# Patient Record
Sex: FEMALE | Race: NATIVE HAWAIIAN OR OTHER PACIFIC ISLANDER | NOT HISPANIC OR LATINO
[De-identification: names, ages, dates, MRNs, and addresses within clinical notes are randomized per-mention and may not be internally consistent; named-entity substitution may affect disease eponyms.]

---

## 2022-11-17 ENCOUNTER — TRANSCRIBE ORDERS (OUTPATIENT)
Dept: OTHER | Age: 62
End: 2022-11-17

## 2022-11-17 DIAGNOSIS — C50.919 BREAST CANCER (H): Primary | ICD-10-CM

## 2022-12-02 NOTE — TELEPHONE ENCOUNTER
MEDICAL RECORDS REQUEST   Radiation Oncology  [address] [place] [address]  PHONE: [phone]  Fax: [phone]        FUTURE VISIT INFORMATION                                                   Denise Tsang, : 1960 scheduled for future visit at [facility] Radiation Oncology    RECORDS REQUESTED FOR VISIT                                                     NOTES STATUS DETAILS   OFFICE NOTE from referring provider [facility] 10/19/22: Dr. Nguyen Perez   OFFICE NOTE from medical oncologist [facility] 22: Dr. Lia Zurita   OPERATIVE REPORT [facility] 22: LUMPECTOMY UNILATERAL WITH SEED LOCALIZATION AND SENTINEL LYMPH NODE BIOPSY   MEDICATION LIST [facility]    LABS     PATHOLOGY REPORTS Report in [facility] 22: [id number]  10/10/22: [id number]   ANYTHING RELATED TO DIAGNOSIS [facility] Most recent 10/19/22   IMAGING (NEED IMAGES & REPORT)     MAMMO PACS 22-01/11/10   XRAYS PACS 20: DEXA   ULTRASOUND PACS 22-10/06/22: US Breast

## 2022-12-05 ENCOUNTER — PRE VISIT (OUTPATIENT)
Dept: RADIATION ONCOLOGY | Facility: CLINIC | Age: 62
End: 2022-12-05

## 2022-12-05 ENCOUNTER — APPOINTMENT (OUTPATIENT)
Dept: RADIATION ONCOLOGY | Facility: CLINIC | Age: 62
End: 2022-12-05
Payer: COMMERCIAL

## 2022-12-05 PROCEDURE — 77263 THER RADIOLOGY TX PLNG CPLX: CPT | Performed by: RADIOLOGY

## 2022-12-05 NOTE — PROGRESS NOTES
Dear Colleagues,  Today Denise Tsang was seen in consultation.  IDENTIFICATION: This is a 62 year old woman with left (LIQ) breast cancer, status post lumpectomy and SLNBx, revealing G2 IDCA with grade 3 DCIS, [id number] ER+/[place]+/H2N- disease referred for adjuvant radiation therapy. Her Oncotype score is 12 and she will not be receiving chemotherapy.  HISTORY OF PRESENT ILLNESS: Denise Tsang was in her usual state of health this past year.  On September 29, 2022 bilateral screening mammogram showed the left breast 7 o'clock position asymmetry.  The remainder of the breast tissue was unremarkable.  On October 6, 2022 left diagnostic mammogram and ultrasound confirmed asymmetry at the 7 o'clock position.  By ultrasound the mass measured 1.0 cm in greatest dimension and there was no axillary adenopathy.  October 10, 2022 left breast ultrasound-guided biopsy was consistent with grade 3 IDC, ER/[place] positive HER2/isdiro negative  On November 8, 2022, Dr. Perez took her to the OR and she had a left breast lumpectomy and sentinel lymph node biopsy.  Pathology revealed single focus of 1.1 cm of grade 2 invasive ductal carcinoma with grade 3 DCIS.  Negative LVSI.  Negative invasive margins and adequate noninvasive margins.  There were 0 out of 2 lymph nodes.  This gave her a pathologic stage of [id number] ER/[place] positive HER2/isidro negative disease. Specimen radiograph was completed.  The left breast surgical specimen radiograph demonstrated the tissue containing the BX marker clip and biopsy site marker corresponding to the known area of malignancy.  Her postoperative course has been unenventful.  She was recently seen by Dr. Zurita.  Her Oncotype score is 12. The benefit of treatment did not outweigh the risks and no systemic chemotherapy is recommended.  She is to receive adjuvant endocrine therapy after XRT.    Since her surgery, she has continued to heal well and denies any significant pain.  She has good ROM.   "She denies any other new masses, skin changes, shortness of breath, chest or bony pain, or new neurologic symptoms. She is being seen here today for consideration of postoperative radiotherapy.  REVIEW OF SYSTEMS: As per HPI, a 14-point review of system is otherwise negative.  PAST RADIATION THERAPY:  Denies.  PAST CTD/PACEMAKER: Denies  BREAST RISK FACTORS:  No history of prior breast biopsy. No family history of breast or ovarian cancer. She started her menses at age 9.   (twins) with her first delivery at age 26. She did not breastfeed. No history of HRT. OCP use x 20 years.  Hysterectomy at age 41.    Past Medical History:   Diagnosis Date     Fear of flying 2016     Hyperlipidemia 2003    Formatting of this note might be different from the original. We used the 10 yr ASCVD risk  using her current risk factors and  her \"worst\" cholesterol numbers in , and found that her ASCVD risk was  low, around 3%, and she may not be in a statin benefit group after all.   Denise prefers not to use medication if not necessary. Will hold  atorvastatin for three months, recheck lipids      Malignant neoplasm of lower-inner quadrant of left breast in female, estrogen receptor positive (H) 10/11/2022     YIMI (obstructive sleep apnea) 2022    Last Assessment & Plan:  Formatting of this note might be different from the original. HST done 22 (wt 175 lbs) RDI 33 Lowest 02 80% Positional therapy with dental appliance     Sciatica 2013     Varicose veins of both lower extremities with complications 3/21/2019    Formatting of this note might be different from the original. Added automatically from request for surgery [id number]       Past Surgical History:   Procedure Laterality Date     APPENDECTOMY       BREAST BIOPSY, RT/LT Left 10/10/2022     IR ENDOVENOUS ABLATION VARICOSE VEINS-RFA       LUMPECTOMY BREAST WITH SENTINEL NODE, COMBINED Left 2022    L breast seed localized lumpectomy with L " SLN bx Dr. Nguyen Perez [employer]     PARTIAL HYSTERECTOMY      ovaries intact, age 41       History reviewed. No pertinent family history.    Social History     Tobacco Use     Smoking status: Former     Packs/day: 1.00     Years: 10.00     Pack years: 10.00     Types: Cigarettes     Quit date: 1986     Years since quittin.9     Smokeless tobacco: Never   Substance Use Topics     Alcohol use: Not Currently     Comment: 3 per week     Current Outpatient Medications   Medication     atorvastatin (LIPITOR) 20 MG tablet     minoxidil (LONITEN) 2.5 MG tablet     Multiple Vitamin (MULTIVITAMIN ADULT PO)     spironolactone (ALDACTONE) 50 MG tablet     albuterol (PROAIR HFA/PROVENTIL HFA/VENTOLIN HFA) 108 (90 Base) MCG/ACT inhaler     ALPRAZolam (XANAX) 0.5 MG tablet     fluticasone (FLONASE) 50 MCG/ACT nasal spray     gabapentin (NEURONTIN) 100 MG capsule     No current facility-administered medications for this visit.        Allergies   Allergen Reactions     Azithromycin Nausea and Vomiting     Erythromycin Nausea and Vomiting     PHYSICAL EXAMINATION:  /72   Pulse 77   Temp 98.4  F (36.9  C) (Oral)   Resp 16   Wt 82.9 kg (182 lb 11.2 oz)   SpO2 96%   GENERAL Well-appearing woman in no acute distress.  HEENT Normocephalic, atraumatic.  Sclerae anicteric.  CVR  Regular rate and rhythm.  No murmurs, rubs, or gallops.  LUNGS Clear to auscultation bilaterally.  BREASTS Breasts are examined in the supine and upright position.  The breasts are symmetric.  The right breast is unremarkable, as there is no erythema, ulceration or suspicious nodularity within it.  Within the left lower inner breast and left axilla there are two well healed incisions.  Firmness of these incisions is not suspicious.  There is no suspicious erythema, ulceration or nodularity within the left breast.  There is no erythema, retraction, desquamation or discharge appreciated within the right or left nipple areolar complex.     LYMPH No supraclavicular, infraclavicular, or axillary lymphadenopathy appreciated bilaterally.  ABDOMEN Soft.    EXT  No clubbing or cyanosis or edema.    NEURO No focal deficits.  MSK  Good ROM.   SKIN  Warm and well perfused.    PSYCH  Alert and oriented x 3    ECOG PERFORMANCE STATUS: 0    IMPRESSION/PLAN: Denise Tsang is a 62 year old woman with left (LIQ) breast cancer, status post lumpectomy and SLNBx, revealing G2 IDCA with grade 3 DCIS, [id number] ER+/[place]+/H2N- disease referred for adjuvant radiation therapy. Her Oncotype score is 12 and she will not be receiving chemotherapy.  I recommend adjuvant XRT to improve local control and overall survival.  Plan is to treat the affected breast based on multiple randomized prospective data which show decrease risk of local recurrence by ~50% with the addition of XRT to BCS and the EBCTCG metaanalysis published in Lancet 2011 which shows that for every 4 recurrences avoided by year 10 one breast cancer death is avoided by year 15.    The risks, benefits, treatment rationale and regimen of radiation therapy to the breast were discussed in great detail today with the patient.  Risks include but are not limited to skin erythema, desquamation, hyperpigmentation, breast and lymphedema, fibrosis, telengectasia, pneumonitis, cardiac toxicity, rib fracture and secondary malignancy. The patient consented to therapy and will have a CT simulation completed today. Treatment will start within 1 week.    Additional problem list to be addressed in the following manner:  1. Systemic/hormonal treatment : No systemic chemo recommended.  Will f/u with Med Onc 1-2 weeks after XRT completed to discuss adjuvant endocrine therapy.     There was ample time for questions and all were answered to the patient's satisfaction. Thank you for allowing me to participate in the care of this pleasant patient. If you have any questions, please do not hesitate to contact my  office.    Sincerely,  Fadi Steele MD

## 2022-12-06 ENCOUNTER — OFFICE VISIT (OUTPATIENT)
Dept: RADIATION ONCOLOGY | Facility: CLINIC | Age: 62
End: 2022-12-06
Payer: COMMERCIAL

## 2022-12-06 VITALS
RESPIRATION RATE: 16 BRPM | OXYGEN SATURATION: 96 % | DIASTOLIC BLOOD PRESSURE: 72 MMHG | HEART RATE: 77 BPM | SYSTOLIC BLOOD PRESSURE: 111 MMHG | TEMPERATURE: 98.4 F | WEIGHT: 182.7 LBS

## 2022-12-06 DIAGNOSIS — Z17.0 MALIGNANT NEOPLASM OF LOWER-INNER QUADRANT OF LEFT BREAST IN FEMALE, ESTROGEN RECEPTOR POSITIVE (H): Primary | ICD-10-CM

## 2022-12-06 DIAGNOSIS — C50.312 MALIGNANT NEOPLASM OF LOWER-INNER QUADRANT OF LEFT BREAST IN FEMALE, ESTROGEN RECEPTOR POSITIVE (H): Primary | ICD-10-CM

## 2022-12-06 PROBLEM — I83.893 VARICOSE VEINS OF BOTH LOWER EXTREMITIES WITH COMPLICATIONS: Status: ACTIVE | Noted: 2019-03-21

## 2022-12-06 PROBLEM — G47.33 OSA (OBSTRUCTIVE SLEEP APNEA): Status: ACTIVE | Noted: 2022-07-05

## 2022-12-06 PROCEDURE — 99205 OFFICE O/P NEW HI 60 MIN: CPT | Performed by: RADIOLOGY

## 2022-12-06 RX ORDER — GABAPENTIN 100 MG/1
1-3 CAPSULE ORAL
COMMUNITY
Start: 2022-08-07

## 2022-12-06 RX ORDER — ATORVASTATIN CALCIUM 20 MG/1
20 TABLET, FILM COATED ORAL DAILY
COMMUNITY
Start: 2022-12-04

## 2022-12-06 RX ORDER — ALPRAZOLAM 0.5 MG
0.5 TABLET ORAL DAILY PRN
COMMUNITY
Start: 2022-01-11

## 2022-12-06 RX ORDER — SPIRONOLACTONE 50 MG/1
50 TABLET, FILM COATED ORAL 2 TIMES DAILY
COMMUNITY
Start: 2022-10-18

## 2022-12-06 RX ORDER — ALBUTEROL SULFATE 90 UG/1
1-2 AEROSOL, METERED RESPIRATORY (INHALATION) EVERY 4 HOURS PRN
COMMUNITY
Start: 2022-11-29

## 2022-12-06 RX ORDER — MINOXIDIL 2.5 MG/1
2.5 TABLET ORAL DAILY
COMMUNITY
Start: 2022-09-15

## 2022-12-06 RX ORDER — FLUTICASONE PROPIONATE 50 MCG
2 SPRAY, SUSPENSION (ML) NASAL DAILY PRN
COMMUNITY
Start: 2022-10-19

## 2022-12-06 ASSESSMENT — PAIN SCALES - GENERAL: PAINLEVEL: NO PAIN (0)

## 2022-12-06 NOTE — LETTER
12/6/2022         RE: Denise Tsang  [address] [facility] [place] [address]        Dear Colleague,    Thank you for referring your patient, Denise Tsang, to the [facility] RADIATION ONCOLOGY [place]. Please see a copy of my visit note below.    Dear Colleagues,  Today Denise Tsang was seen in consultation.  IDENTIFICATION: This is a 62 year old woman with left (LIQ) breast cancer, status post lumpectomy and SLNBx, revealing G2 IDCA with grade 3 DCIS, [id number] ER+/[place]+/H2N- disease referred for adjuvant radiation therapy. Her Oncotype score is 12 and she will not be receiving chemotherapy.  HISTORY OF PRESENT ILLNESS: Denise Tsang was in her usual state of health this past year.  On September 29, 2022 bilateral screening mammogram showed the left breast 7 o'clock position asymmetry.  The remainder of the breast tissue was unremarkable.  On October 6, 2022 left diagnostic mammogram and ultrasound confirmed asymmetry at the 7 o'clock position.  By ultrasound the mass measured 1.0 cm in greatest dimension and there was no axillary adenopathy.  October 10, 2022 left breast ultrasound-guided biopsy was consistent with grade 3 IDC, ER/UT positive HER2/isidro negative  On November 8, 2022, Dr. Perez took her to the OR and she had a left breast lumpectomy and sentinel lymph node biopsy.  Pathology revealed single focus of 1.1 cm of grade 2 invasive ductal carcinoma with grade 3 DCIS.  Negative LVSI.  Negative invasive margins and adequate noninvasive margins.  There were 0 out of 2 lymph nodes.  This gave her a pathologic stage of [id number] ER/UT positive HER2/isidro negative disease. Specimen radiograph was completed.  The left breast surgical specimen radiograph demonstrated the tissue containing the BX marker clip and biopsy site marker corresponding to the known area of malignancy.  Her postoperative course has been unenventful.  She was recently seen by Dr. Zurita.  Her Oncotype  "score is 12. The benefit of treatment did not outweigh the risks and no systemic chemotherapy is recommended.  She is to receive adjuvant endocrine therapy after XRT.    Since her surgery, she has continued to heal well and denies any significant pain.  She has good ROM.  She denies any other new masses, skin changes, shortness of breath, chest or bony pain, or new neurologic symptoms. She is being seen here today for consideration of postoperative radiotherapy.  REVIEW OF SYSTEMS: As per HPI, a 14-point review of system is otherwise negative.  PAST RADIATION THERAPY:  Denies.  PAST CTD/PACEMAKER: Denies  BREAST RISK FACTORS:  No history of prior breast biopsy. No family history of breast or ovarian cancer. She started her menses at age 9.   (twins) with her first delivery at age 26. She did not breastfeed. No history of HRT. OCP use x 20 years.  Hysterectomy at age 41.    Past Medical History:   Diagnosis Date     Fear of flying 2016     Hyperlipidemia 2003    Formatting of this note might be different from the original. We used the 10 yr ASCVD risk  using her current risk factors and  her \"worst\" cholesterol numbers in , and found that her ASCVD risk was  low, around 3%, and she may not be in a statin benefit group after all.   Denise prefers not to use medication if not necessary. Will hold  atorvastatin for three months, recheck lipids      Malignant neoplasm of lower-inner quadrant of left breast in female, estrogen receptor positive (H) 10/11/2022     YIMI (obstructive sleep apnea) 2022    Last Assessment & Plan:  Formatting of this note might be different from the original. HST done 22 (wt 175 lbs) RDI 33 Lowest 02 80% Positional therapy with dental appliance     Sciatica 2013     Varicose veins of both lower extremities with complications 3/21/2019    Formatting of this note might be different from the original. Added automatically from request for surgery [id number] "       Past Surgical History:   Procedure Laterality Date     APPENDECTOMY       BREAST BIOPSY, RT/LT Left 10/10/2022     IR ENDOVENOUS ABLATION VARICOSE VEINS-RFA       LUMPECTOMY BREAST WITH SENTINEL NODE, COMBINED Left 2022    L breast seed localized lumpectomy with L SLN bx Dr. Nguyen Perez [employer]     PARTIAL HYSTERECTOMY      ovaries intact, age 41       History reviewed. No pertinent family history.    Social History     Tobacco Use     Smoking status: Former     Packs/day: 1.00     Years: 10.00     Pack years: 10.00     Types: Cigarettes     Quit date: 1986     Years since quittin.9     Smokeless tobacco: Never   Substance Use Topics     Alcohol use: Not Currently     Comment: 3 per week     Current Outpatient Medications   Medication     atorvastatin (LIPITOR) 20 MG tablet     minoxidil (LONITEN) 2.5 MG tablet     Multiple Vitamin (MULTIVITAMIN ADULT PO)     spironolactone (ALDACTONE) 50 MG tablet     albuterol (PROAIR HFA/PROVENTIL HFA/VENTOLIN HFA) 108 (90 Base) MCG/ACT inhaler     ALPRAZolam (XANAX) 0.5 MG tablet     fluticasone (FLONASE) 50 MCG/ACT nasal spray     gabapentin (NEURONTIN) 100 MG capsule     No current facility-administered medications for this visit.        Allergies   Allergen Reactions     Azithromycin Nausea and Vomiting     Erythromycin Nausea and Vomiting     PHYSICAL EXAMINATION:  /72   Pulse 77   Temp 98.4  F (36.9  C) (Oral)   Resp 16   Wt 82.9 kg (182 lb 11.2 oz)   SpO2 96%   GENERAL Well-appearing woman in no acute distress.  HEENT Normocephalic, atraumatic.  Sclerae anicteric.  CVR  Regular rate and rhythm.  No murmurs, rubs, or gallops.  LUNGS Clear to auscultation bilaterally.  BREASTS Breasts are examined in the supine and upright position.  The breasts are symmetric.  The right breast is unremarkable, as there is no erythema, ulceration or suspicious nodularity within it.  Within the left lower inner breast and left axilla there are two well  healed incisions.  Firmness of these incisions is not suspicious.  There is no suspicious erythema, ulceration or nodularity within the left breast.  There is no erythema, retraction, desquamation or discharge appreciated within the right or left nipple areolar complex.    LYMPH No supraclavicular, infraclavicular, or axillary lymphadenopathy appreciated bilaterally.  ABDOMEN Soft.    EXT  No clubbing or cyanosis or edema.    NEURO No focal deficits.  MSK  Good ROM.   SKIN  Warm and well perfused.    PSYCH  Alert and oriented x 3    ECOG PERFORMANCE STATUS: 0    IMPRESSION/PLAN: Denise Tsang is a 62 year old woman with left (LIQ) breast cancer, status post lumpectomy and SLNBx, revealing G2 IDCA with grade 3 DCIS, [id number] ER+/RI+/H2N- disease referred for adjuvant radiation therapy. Her Oncotype score is 12 and she will not be receiving chemotherapy.  I recommend adjuvant XRT to improve local control and overall survival.  Plan is to treat the affected breast based on multiple randomized prospective data which show decrease risk of local recurrence by ~50% with the addition of XRT to BCS and the EBCTCG metaanalysis published in Lancet 2011 which shows that for every 4 recurrences avoided by year 10 one breast cancer death is avoided by year 15.    The risks, benefits, treatment rationale and regimen of radiation therapy to the breast were discussed in great detail today with the patient.  Risks include but are not limited to skin erythema, desquamation, hyperpigmentation, breast and lymphedema, fibrosis, telengectasia, pneumonitis, cardiac toxicity, rib fracture and secondary malignancy. The patient consented to therapy and will have a CT simulation completed today. Treatment will start within 1 week.    Additional problem list to be addressed in the following manner:  1. Systemic/hormonal treatment : No systemic chemo recommended.  Will f/u with Med Onc 1-2 weeks after XRT completed to discuss adjuvant  endocrine therapy.     There was ample time for questions and all were answered to the patient's satisfaction. Thank you for allowing me to participate in the care of this pleasant patient. If you have any questions, please do not hesitate to contact my office.    Sincerely,  Fadi Steele MD            Again, thank you for allowing me to participate in the care of your patient.        Sincerely,        DEBBY Steele MD

## 2022-12-06 NOTE — NURSING NOTE
REASON FOR APPOINTMENT   Type of Cancer: L breast IDC ER/MD+ HER2-   Location: L breast  Date of Symptom Onset: Bilateral screening mammogram 22    TREATMENT TO-DATE FOR THIS CANCER  Surgery ? 22  L breast seed localized lumpectomy with L SLN bx Dr. Nguyen Perez   Chemotherapy ? No, Oncotype still pending  Other Treatments for this Cancer ? no    PERSONAL HISTORY OF CANCER   Previous Cancer ? no   Prior Radiation ? no   Prior Chemotherapy ? no   Prior Hormonal Therapy ? no     REFERRALS NEEDED  no    VITALS  /72   Pulse 77   Temp 98.4  F (36.9  C) (Oral)   Resp 16   Wt 82.9 kg (182 lb 11.2 oz)   SpO2 96%     PACEMAKER/IMPLANTED CARDIAC DEVICE no    PAIN  Denies    PSYCHOSOCIAL  Marital Status:   Patient lives in home with spouse.  Number of children: 2  Working status: Retired  Do you feel safe in your home? Yes    REVIEW OF SYSTEMS  Skin: negative  Eyes: positive for glasses  Ears/Nose/Throat: negative  Respiratory: No shortness of breath, dyspnea on exertion, cough, or hemoptysis  Cardiovascular: negative  Gastrointestinal: positive for constipation  Genitourinary: negative  Musculoskeletal: negative  Neurologic: negative  Psychiatric: negative  Hematologic/Lymphatic/Immunologic: negative  Endocrine: negative    WOMEN ONLY  Any chance you may be pregnant: No  Age at first period: 9  Date of last period: partial hysterectomy age 41  Number of pregnancies: 4  Live Births: 1, twins  Age at 1st delivery: 26  Breastfeeding: No  Birth Control pills: Yes  If yes, for how lon years  HRT: No    Radiation Oncology Patient Teaching    Current Concern: Breast cancer    Person involved with teaching: Patient and   Patient asked Questions: Yes  Patient was cooperative: Yes  Patient was receptive (willing to accept information given): Yes    Education Assessment  Comprehension ability: High  Knowledge level: Medium  Factors affecting teaching: None    Education Materials Given  Caring for  Your Skin During Radiation, Aquaphor or Vanicream, Fatigue    Educational Topics Discussed  Side effects- see above    Response To Teaching  Verbalizes understanding    Do you have an advanced directive or living will? No  Are you DNR/DNI? No    Camille Danielle RN

## 2022-12-08 ENCOUNTER — PATIENT OUTREACH (OUTPATIENT)
Dept: RADIATION ONCOLOGY | Facility: CLINIC | Age: 62
End: 2022-12-08

## 2022-12-08 NOTE — TELEPHONE ENCOUNTER
Received telephone call from patient reporting she has recently seen Dr. Steele for consultation for radiation treatment.  Patient reports her Oncotype result through [employer] has been finalized and she has seen in [facility] that her Oncotype score is 12.  Patient reports she is scheduled to visit with Dr. Zurita on 12/14/2022 for review of her results.  Patient is wondering if she can move forward with next steps of CT Simulation for her radiation treatment at this time or if she needs to wait until after her visit with Dr. Zurita.  Patient requests if she needs to wait until after her visit with Dr. Zurita, she wonders if she can schedule an appointment at this time.  Informed patient that this RN would relay above information to Dr. Steele and Camille Danielle RN with request to contact patient to assist in next steps.  Patient verbalized understanding of all information and had no additional questions at this time.    In-basket message sent to Dr. Steele and Camille Danielle RN with above information and request to contact patient.    Colleen Bowers, RN BSN OCN CBCN

## 2022-12-13 ENCOUNTER — APPOINTMENT (OUTPATIENT)
Dept: RADIATION ONCOLOGY | Facility: CLINIC | Age: 62
End: 2022-12-13
Payer: COMMERCIAL

## 2022-12-13 PROCEDURE — 77290 THER RAD SIMULAJ FIELD CPLX: CPT | Performed by: RADIOLOGY

## 2022-12-13 PROCEDURE — 77332 RADIATION TREATMENT AID(S): CPT | Performed by: RADIOLOGY

## 2022-12-22 ENCOUNTER — APPOINTMENT (OUTPATIENT)
Dept: RADIATION ONCOLOGY | Facility: CLINIC | Age: 62
End: 2022-12-22
Payer: COMMERCIAL

## 2022-12-22 PROCEDURE — 77300 RADIATION THERAPY DOSE PLAN: CPT | Performed by: RADIOLOGY

## 2022-12-22 PROCEDURE — 77295 3-D RADIOTHERAPY PLAN: CPT | Performed by: RADIOLOGY

## 2022-12-22 PROCEDURE — 77293 RESPIRATOR MOTION MGMT SIMUL: CPT | Performed by: RADIOLOGY

## 2022-12-22 PROCEDURE — 77334 RADIATION TREATMENT AID(S): CPT | Performed by: RADIOLOGY

## 2022-12-26 ENCOUNTER — APPOINTMENT (OUTPATIENT)
Dept: RADIATION ONCOLOGY | Facility: CLINIC | Age: 62
End: 2022-12-26
Payer: COMMERCIAL

## 2022-12-26 PROCEDURE — 77280 THER RAD SIMULAJ FIELD SMPL: CPT | Performed by: RADIOLOGY

## 2022-12-27 ENCOUNTER — APPOINTMENT (OUTPATIENT)
Dept: RADIATION ONCOLOGY | Facility: CLINIC | Age: 62
End: 2022-12-27
Payer: COMMERCIAL

## 2022-12-27 PROCEDURE — G6012 RADIATION TREATMENT DELIVERY: HCPCS | Performed by: RADIOLOGY

## 2022-12-27 PROCEDURE — G6017 INTRAFRACTION TRACK MOTION: HCPCS | Performed by: RADIOLOGY

## 2022-12-28 ENCOUNTER — OFFICE VISIT (OUTPATIENT)
Dept: RADIATION ONCOLOGY | Facility: CLINIC | Age: 62
End: 2022-12-28
Payer: COMMERCIAL

## 2022-12-28 ENCOUNTER — APPOINTMENT (OUTPATIENT)
Dept: RADIATION ONCOLOGY | Facility: CLINIC | Age: 62
End: 2022-12-28
Payer: COMMERCIAL

## 2022-12-28 VITALS
RESPIRATION RATE: 16 BRPM | SYSTOLIC BLOOD PRESSURE: 133 MMHG | TEMPERATURE: 97.7 F | HEART RATE: 73 BPM | WEIGHT: 184 LBS | OXYGEN SATURATION: 99 % | DIASTOLIC BLOOD PRESSURE: 84 MMHG

## 2022-12-28 DIAGNOSIS — Z17.0 MALIGNANT NEOPLASM OF LOWER-INNER QUADRANT OF LEFT BREAST IN FEMALE, ESTROGEN RECEPTOR POSITIVE (H): Primary | ICD-10-CM

## 2022-12-28 DIAGNOSIS — C50.312 MALIGNANT NEOPLASM OF LOWER-INNER QUADRANT OF LEFT BREAST IN FEMALE, ESTROGEN RECEPTOR POSITIVE (H): Primary | ICD-10-CM

## 2022-12-28 PROCEDURE — G6017 INTRAFRACTION TRACK MOTION: HCPCS | Performed by: RADIOLOGY

## 2022-12-28 PROCEDURE — G6012 RADIATION TREATMENT DELIVERY: HCPCS | Performed by: RADIOLOGY

## 2022-12-28 PROCEDURE — 99207 PR DROP WITH A PROCEDURE: CPT | Performed by: RADIOLOGY

## 2022-12-28 ASSESSMENT — PAIN SCALES - GENERAL: PAINLEVEL: NO PAIN (0)

## 2022-12-28 NOTE — PROGRESS NOTES
[facility]  SPECIALIZING IN BREAKTHROUGHS  Radiation Oncology    On Treatment Visit Note      Denise Tsang      Date: 2022   MRN: [id number]   : 1960  Diagnosis: L breast IDC ER/[place]+ HEr2-      Reason for Visit:  On Radiation Treatment Visit     Treatment Summary to Date  Treatment Site: L breast + bst Current Dose: 532/5256 cGy Fractions:       Chemotherapy  Chemo concurrent with radx?: No (Dr. Zurita)    Subjective:     Early in treatment doing well with no unexpected side effects.    Nursing ROS:   Nutrition Alteration  Diet Type: Patient's Preference  Skin  Skin Reaction: 0 - No changes  Skin Intervention: Aquaphor BID        Cardiovascular  Respiratory effort: 1 - Normal - without distress        Psychosocial  Psychosocial Note: Patient denies fatigue  Pain Assessment  0-10 Pain Scale: 0      Objective:   /84   Pulse 73   Temp 97.7  F (36.5  C) (Oral)   Resp 16   Wt 83.5 kg (184 lb)   SpO2 99%   Gen: Appears well, in no acute distress  Skin: No erythema    Labs:  CBC RESULTS: No results for input(s): WBC, RBC, HGB, HCT, MCV, MCH, MCHC, RDW, PLT in the last 76495 hours.  ELECTROLYTES:  No results for input(s): NA, POTASSIUM, CHLORIDE, DAVID, CO2, BUN, CR, GLC in the last 49526 hours.    Assessment:    Tolerating radiation therapy well.  All questions and concerns addressed.    Plan:   1. Continue current therapy.    2. Skin care as previously directed.      Mosaiq chart and setup information reviewed  MVCT/IGRT images checked    Medication Review  Med list reviewed with patient?: Yes    Educational Topic Discussed  Education Instructions: Skin cares, fatigue        Fadi Steele MD    Please do not send letter to referring physician.

## 2022-12-28 NOTE — LETTER
2022         RE: Denise Tsang  [address] [facility] [address]        Dear Colleague,    Thank you for referring your patient, Denise Tsang, to the [facility] [place]. Please see a copy of my visit note below.    [facility]  SPECIALIZING IN BREAKTHROUGHS  Radiation Oncology    On Treatment Visit Note      Denise Tsang      Date: 2022   MRN: [id number]   : 1960  Diagnosis: L breast IDC ER/MI+ HEr2-      Reason for Visit:  On Radiation Treatment Visit     Treatment Summary to Date  Treatment Site: L breast + bst Current Dose: 532/5256 cGy Fractions:       Chemotherapy  Chemo concurrent with radx?: No (Dr. Zurita)    Subjective:     Early in treatment doing well with no unexpected side effects.    Nursing ROS:   Nutrition Alteration  Diet Type: Patient's Preference  Skin  Skin Reaction: 0 - No changes  Skin Intervention: Aquaphor BID        Cardiovascular  Respiratory effort: 1 - Normal - without distress        Psychosocial  Psychosocial Note: Patient denies fatigue  Pain Assessment  0-10 Pain Scale: 0      Objective:   /84   Pulse 73   Temp 97.7  F (36.5  C) (Oral)   Resp 16   Wt 83.5 kg (184 lb)   SpO2 99%   Gen: Appears well, in no acute distress  Skin: No erythema    Labs:  CBC RESULTS: No results for input(s): WBC, RBC, HGB, HCT, MCV, MCH, MCHC, RDW, PLT in the last 75403 hours.  ELECTROLYTES:  No results for input(s): NA, POTASSIUM, CHLORIDE, DAVID, CO2, BUN, CR, GLC in the last 76901 hours.    Assessment:    Tolerating radiation therapy well.  All questions and concerns addressed.    Plan:   1. Continue current therapy.    2. Skin care as previously directed.      Mosaiq chart and setup information reviewed  MVCT/IGRT images checked    Medication Review  Med list reviewed with patient?: Yes    Educational Topic Discussed  Education Instructions: Skin cares, fatigue        Fadi Steele  MD    Please do not send letter to referring physician.          Again, thank you for allowing me to participate in the care of your patient.        Sincerely,        DEBBY Steele MD

## 2022-12-29 ENCOUNTER — APPOINTMENT (OUTPATIENT)
Dept: RADIATION ONCOLOGY | Facility: CLINIC | Age: 62
End: 2022-12-29
Payer: COMMERCIAL

## 2022-12-29 PROCEDURE — 77412 RADIATION TX DELIVERY LVL 3: CPT | Performed by: RADIOLOGY

## 2022-12-29 PROCEDURE — 77387 GUIDANCE FOR RADJ TX DLVR: CPT | Performed by: RADIOLOGY

## 2022-12-30 ENCOUNTER — APPOINTMENT (OUTPATIENT)
Dept: RADIATION ONCOLOGY | Facility: CLINIC | Age: 62
End: 2022-12-30
Payer: COMMERCIAL

## 2022-12-30 PROCEDURE — G6012 RADIATION TREATMENT DELIVERY: HCPCS | Performed by: RADIOLOGY

## 2022-12-30 PROCEDURE — G6017 INTRAFRACTION TRACK MOTION: HCPCS | Performed by: RADIOLOGY

## 2023-01-02 ENCOUNTER — APPOINTMENT (OUTPATIENT)
Dept: RADIATION ONCOLOGY | Facility: CLINIC | Age: 63
End: 2023-01-02
Payer: COMMERCIAL

## 2023-01-02 PROCEDURE — 77387 GUIDANCE FOR RADJ TX DLVR: CPT | Performed by: RADIOLOGY

## 2023-01-02 PROCEDURE — 77412 RADIATION TX DELIVERY LVL 3: CPT | Performed by: RADIOLOGY

## 2023-01-02 PROCEDURE — 77427 RADIATION TX MANAGEMENT X5: CPT | Performed by: RADIOLOGY

## 2023-01-02 PROCEDURE — 77336 RADIATION PHYSICS CONSULT: CPT | Performed by: RADIOLOGY

## 2023-01-03 ENCOUNTER — APPOINTMENT (OUTPATIENT)
Dept: RADIATION ONCOLOGY | Facility: CLINIC | Age: 63
End: 2023-01-03
Payer: COMMERCIAL

## 2023-01-03 PROCEDURE — G6012 RADIATION TREATMENT DELIVERY: HCPCS | Performed by: RADIOLOGY

## 2023-01-04 ENCOUNTER — OFFICE VISIT (OUTPATIENT)
Dept: RADIATION ONCOLOGY | Facility: CLINIC | Age: 63
End: 2023-01-04
Payer: COMMERCIAL

## 2023-01-04 ENCOUNTER — APPOINTMENT (OUTPATIENT)
Dept: RADIATION ONCOLOGY | Facility: CLINIC | Age: 63
End: 2023-01-04
Payer: COMMERCIAL

## 2023-01-04 VITALS
HEART RATE: 70 BPM | DIASTOLIC BLOOD PRESSURE: 77 MMHG | OXYGEN SATURATION: 97 % | WEIGHT: 185.9 LBS | SYSTOLIC BLOOD PRESSURE: 139 MMHG | RESPIRATION RATE: 18 BRPM | TEMPERATURE: 98 F

## 2023-01-04 DIAGNOSIS — C50.312 MALIGNANT NEOPLASM OF LOWER-INNER QUADRANT OF LEFT BREAST IN FEMALE, ESTROGEN RECEPTOR POSITIVE (H): Primary | ICD-10-CM

## 2023-01-04 DIAGNOSIS — Z17.0 MALIGNANT NEOPLASM OF LOWER-INNER QUADRANT OF LEFT BREAST IN FEMALE, ESTROGEN RECEPTOR POSITIVE (H): Primary | ICD-10-CM

## 2023-01-04 PROCEDURE — 99207 PR DROP WITH A PROCEDURE: CPT | Performed by: RADIOLOGY

## 2023-01-04 PROCEDURE — G6012 RADIATION TREATMENT DELIVERY: HCPCS | Performed by: RADIOLOGY

## 2023-01-04 PROCEDURE — G6017 INTRAFRACTION TRACK MOTION: HCPCS | Performed by: RADIOLOGY

## 2023-01-04 ASSESSMENT — PAIN SCALES - GENERAL: PAINLEVEL: NO PAIN (0)

## 2023-01-04 NOTE — LETTER
2023         RE: Denise Tsang  [address] [facility] [address]        Dear Colleague,    Thank you for referring your patient, Denise Tsang, to the [facility] RADIATION [facility]COL[facility] [place]. Please see a copy of my visit note below.    .mgrn      [facility]YS[facility]ANS  SPECIALIZING IN BREAKTHROUGHS  Radiation Oncology    On Treatment Visit Note      Denise Tsang      Date: 2023   MRN: [id number]   : 1960  Diagnosis: L breast IDC ER/[place]+ HEr2-      Reason for Visit:  On Radiation Treatment Visit     Treatment Summary to Date  Treatment Site: L breast + bst Current Dose: 1862/5256 cGy Fractions:       Chemotherapy  Chemo concurrent with radx?: No (Dr. Zurita)    Subjective:     Early in treatment doing well with no complaints.    Nursing ROS:   Nutrition Alteration  Diet Type: Patient's Preference  Skin  Skin Intervention: patient reports applying Aquaphor two times dialy        Cardiovascular  Respiratory effort: 1 - Normal - without distress        Psychosocial  Psychosocial Note: Patient denies fatigue  Pain Assessment  0-10 Pain Scale: 0      Objective:   /77 (BP Location: Right arm, Patient Position: Chair, Cuff Size: Adult Regular)   Pulse 70   Temp 98  F (36.7  C) (Oral)   Resp 18   Wt 84.3 kg (185 lb 14.4 oz)   SpO2 97%   Gen: Appears well, in no acute distress  Skin: No erythema    Labs:  CBC RESULTS: No results for input(s): WBC, RBC, HGB, HCT, MCV, MCH, MCHC, RDW, PLT in the last 78038 hours.  ELECTROLYTES:  No results for input(s): NA, POTASSIUM, CHLORIDE, DAVID, CO2, BUN, CR, GLC in the last 19939 hours.    Assessment:    Tolerating radiation therapy well.  All questions and concerns addressed.    Plan:   1. Continue current therapy.    2. Skin care as previously directed.      Mosaiq chart and setup information reviewed  Ports checked    Medication Review  Med list reviewed with patient?: Yes    Educational Topic  Discussed  Education Instructions: reviewed hydration and protein intake        Fadi Steele MD    Please do not send letter to referring physician.          Again, thank you for allowing me to participate in the care of your patient.        Sincerely,        DEBBY Steele MD

## 2023-01-04 NOTE — PATIENT INSTRUCTIONS
Please contact [facility] RN with questions or concerns following today's appointment: [phone].    Thank you!

## 2023-01-04 NOTE — PROGRESS NOTES
[facility] PHYSICIANS  SPECIALIZING IN BREAKTHROUGHS  Radiation Oncology    On Treatment Visit Note      Denise Tsang      Date: 2023   MRN: [id number]   : 1960  Diagnosis: L breast IDC ER/[place]+ HEr2-      Reason for Visit:  On Radiation Treatment Visit     Treatment Summary to Date  Treatment Site: L breast + bst Current Dose: 1862/5256 cGy Fractions:       Chemotherapy  Chemo concurrent with radx?: No (Dr. Zurita)    Subjective:     Early in treatment doing well with no complaints.    Nursing ROS:   Nutrition Alteration  Diet Type: Patient's Preference  Skin  Skin Intervention: patient reports applying Aquaphor two times dialy        Cardiovascular  Respiratory effort: 1 - Normal - without distress        Psychosocial  Psychosocial Note: Patient denies fatigue  Pain Assessment  0-10 Pain Scale: 0      Objective:   /77 (BP Location: Right arm, Patient Position: Chair, Cuff Size: Adult Regular)   Pulse 70   Temp 98  F (36.7  C) (Oral)   Resp 18   Wt 84.3 kg (185 lb 14.4 oz)   SpO2 97%   Gen: Appears well, in no acute distress  Skin: No erythema    Labs:  CBC RESULTS: No results for input(s): WBC, RBC, HGB, HCT, MCV, MCH, MCHC, RDW, PLT in the last 01255 hours.  ELECTROLYTES:  No results for input(s): NA, POTASSIUM, CHLORIDE, DAVID, CO2, BUN, CR, GLC in the last 30722 hours.    Assessment:    Tolerating radiation therapy well.  All questions and concerns addressed.    Plan:   1. Continue current therapy.    2. Skin care as previously directed.      Mosaiq chart and setup information reviewed  Ports checked    Medication Review  Med list reviewed with patient?: Yes    Educational Topic Discussed  Education Instructions: reviewed hydration and protein intake        Fadi Steele MD    Please do not send letter to referring physician.

## 2023-01-05 ENCOUNTER — APPOINTMENT (OUTPATIENT)
Dept: RADIATION ONCOLOGY | Facility: CLINIC | Age: 63
End: 2023-01-05
Payer: COMMERCIAL

## 2023-01-05 PROCEDURE — G6012 RADIATION TREATMENT DELIVERY: HCPCS | Performed by: SURGERY

## 2023-01-05 PROCEDURE — G6017 INTRAFRACTION TRACK MOTION: HCPCS | Performed by: SURGERY

## 2023-01-06 ENCOUNTER — APPOINTMENT (OUTPATIENT)
Dept: RADIATION ONCOLOGY | Facility: CLINIC | Age: 63
End: 2023-01-06
Payer: COMMERCIAL

## 2023-01-06 PROCEDURE — G6012 RADIATION TREATMENT DELIVERY: HCPCS | Performed by: SURGERY

## 2023-01-06 PROCEDURE — G6017 INTRAFRACTION TRACK MOTION: HCPCS | Performed by: SURGERY

## 2023-01-09 ENCOUNTER — APPOINTMENT (OUTPATIENT)
Dept: RADIATION ONCOLOGY | Facility: CLINIC | Age: 63
End: 2023-01-09
Payer: COMMERCIAL

## 2023-01-09 PROCEDURE — G6012 RADIATION TREATMENT DELIVERY: HCPCS | Performed by: RADIOLOGY

## 2023-01-09 PROCEDURE — G6017 INTRAFRACTION TRACK MOTION: HCPCS | Performed by: RADIOLOGY

## 2023-01-09 PROCEDURE — 77427 RADIATION TX MANAGEMENT X5: CPT | Performed by: RADIOLOGY

## 2023-01-09 PROCEDURE — 77336 RADIATION PHYSICS CONSULT: CPT | Performed by: RADIOLOGY

## 2023-01-10 ENCOUNTER — APPOINTMENT (OUTPATIENT)
Dept: RADIATION ONCOLOGY | Facility: CLINIC | Age: 63
End: 2023-01-10
Payer: COMMERCIAL

## 2023-01-10 PROCEDURE — G6012 RADIATION TREATMENT DELIVERY: HCPCS | Performed by: RADIOLOGY

## 2023-01-10 PROCEDURE — G6017 INTRAFRACTION TRACK MOTION: HCPCS | Performed by: RADIOLOGY

## 2023-01-11 ENCOUNTER — APPOINTMENT (OUTPATIENT)
Dept: RADIATION ONCOLOGY | Facility: CLINIC | Age: 63
End: 2023-01-11
Payer: COMMERCIAL

## 2023-01-11 ENCOUNTER — OFFICE VISIT (OUTPATIENT)
Dept: RADIATION ONCOLOGY | Facility: CLINIC | Age: 63
End: 2023-01-11
Payer: COMMERCIAL

## 2023-01-11 VITALS
OXYGEN SATURATION: 100 % | TEMPERATURE: 97.7 F | WEIGHT: 184.4 LBS | SYSTOLIC BLOOD PRESSURE: 120 MMHG | HEART RATE: 75 BPM | RESPIRATION RATE: 16 BRPM | DIASTOLIC BLOOD PRESSURE: 71 MMHG

## 2023-01-11 DIAGNOSIS — C50.312 MALIGNANT NEOPLASM OF LOWER-INNER QUADRANT OF LEFT BREAST IN FEMALE, ESTROGEN RECEPTOR POSITIVE (H): Primary | ICD-10-CM

## 2023-01-11 DIAGNOSIS — Z17.0 MALIGNANT NEOPLASM OF LOWER-INNER QUADRANT OF LEFT BREAST IN FEMALE, ESTROGEN RECEPTOR POSITIVE (H): Primary | ICD-10-CM

## 2023-01-11 PROCEDURE — 77307 TELETHX ISODOSE PLAN CPLX: CPT | Performed by: RADIOLOGY

## 2023-01-11 PROCEDURE — 77334 RADIATION TREATMENT AID(S): CPT | Performed by: RADIOLOGY

## 2023-01-11 PROCEDURE — 99207 PR DROP WITH A PROCEDURE: CPT | Performed by: RADIOLOGY

## 2023-01-11 PROCEDURE — G6012 RADIATION TREATMENT DELIVERY: HCPCS | Performed by: RADIOLOGY

## 2023-01-11 ASSESSMENT — PAIN SCALES - GENERAL: PAINLEVEL: NO PAIN (0)

## 2023-01-11 NOTE — LETTER
2023         RE: Denise Tsang  [address] [facility] [address]        Dear Colleague,    Thank you for referring your patient, Denise Tsang, to the [facility] RADIATION [facility]COL[facility] [place]. Please see a copy of my visit note below.    [facility] PHYSICIANS  SPECIALIZING IN BREAKTHROUGHS  Radiation Oncology    On Treatment Visit Note      Denise Tsang      Date: 2023   MRN: [id number]   : 1960  Diagnosis: L breast IDC ER/[place]+ HEr2-      Reason for Visit:  On Radiation Treatment Visit     Treatment Summary to Date  Treatment Site: L breast + bst Current Dose: 3192/5256 cGy Fractions:       Chemotherapy  Chemo concurrent with radx?: No (Dr. Zurita)    Subjective:     Doing well with occasional fatigue and subtle changes in skin    Nursing ROS:   Nutrition Alteration  Diet Type: Patient's Preference  Skin  Skin Reaction: 1 - Faint erythema or dry desquamation (no desquamation)  Skin Intervention: Aquaphor two times daily, Hydrocortisone if indicated by Dr. Steele.        Cardiovascular  Respiratory effort: 1 - Normal - without distress        Psychosocial  Psychosocial Note: Patient reports occasional fatigue  Pain Assessment  0-10 Pain Scale: 0      Objective:   /71   Pulse 75   Temp 97.7  F (36.5  C) (Oral)   Resp 16   Wt 83.6 kg (184 lb 6.4 oz)   SpO2 100%   Gen: Appears well, in no acute distress  Skin: minimal diffuse erythema over treatment field    Labs:  CBC RESULTS: No results for input(s): WBC, RBC, HGB, HCT, MCV, MCH, MCHC, RDW, PLT in the last 81443 hours.  ELECTROLYTES:  No results for input(s): NA, POTASSIUM, CHLORIDE, DAVID, CO2, BUN, CR, GLC in the last 81296 hours.    Assessment:    Tolerating radiation therapy well.  All questions and concerns addressed.    Toxicities:  Fatigue: Grade 1: Fatigue relieved by rest  Pain: Grade 0: No toxicity  Dermatitis: Grade 1: Faint erythema or dry desquamation    Plan:   1. Continue  current therapy.    2. Skin care as previously directed.      Mosaiq chart and setup information reviewed  Ports checked    Medication Review  Med list reviewed with patient?: Yes    Educational Topic Discussed  Education Instructions: reviewed hydration and protein intake, fatigue, skin cares.        Fadi Steele MD    Please do not send letter to referring physician.          Again, thank you for allowing me to participate in the care of your patient.        Sincerely,        DEBBY Steele MD

## 2023-01-11 NOTE — PROGRESS NOTES
[facility] PHYS[facility]ANS  SPECIALIZING IN BREAKTHROUGHS  Radiation Oncology    On Treatment Visit Note      Denise Tsang      Date: 2023   MRN: [id number]   : 1960  Diagnosis: L breast IDC ER/[place]+ HEr2-      Reason for Visit:  On Radiation Treatment Visit     Treatment Summary to Date  Treatment Site: L breast + bst Current Dose: 3192/5256 cGy Fractions:       Chemotherapy  Chemo concurrent with radx?: No (Dr. Zurita)    Subjective:     Doing well with occasional fatigue and subtle changes in skin    Nursing ROS:   Nutrition Alteration  Diet Type: Patient's Preference  Skin  Skin Reaction: 1 - Faint erythema or dry desquamation (no desquamation)  Skin Intervention: Aquaphor two times daily, Hydrocortisone if indicated by Dr. Steele.        Cardiovascular  Respiratory effort: 1 - Normal - without distress        Psychosocial  Psychosocial Note: Patient reports occasional fatigue  Pain Assessment  0-10 Pain Scale: 0      Objective:   /71   Pulse 75   Temp 97.7  F (36.5  C) (Oral)   Resp 16   Wt 83.6 kg (184 lb 6.4 oz)   SpO2 100%   Gen: Appears well, in no acute distress  Skin: minimal diffuse erythema over treatment field    Labs:  CBC RESULTS: No results for input(s): WBC, RBC, HGB, HCT, MCV, MCH, MCHC, RDW, PLT in the last 05132 hours.  ELECTROLYTES:  No results for input(s): NA, POTASSIUM, CHLORIDE, DAVID, CO2, BUN, CR, GLC in the last 19181 hours.    Assessment:    Tolerating radiation therapy well.  All questions and concerns addressed.    Toxicities:  Fatigue: Grade 1: Fatigue relieved by rest  Pain: Grade 0: No toxicity  Dermatitis: Grade 1: Faint erythema or dry desquamation    Plan:   1. Continue current therapy.    2. Skin care as previously directed.      Mosaiq chart and setup information reviewed  Ports checked    Medication Review  Med list reviewed with patient?: Yes    Educational Topic Discussed  Education Instructions: reviewed hydration and protein  intake, fatigue, skin cares.        Fadi Steele MD    Please do not send letter to referring physician.

## 2023-01-12 ENCOUNTER — APPOINTMENT (OUTPATIENT)
Dept: RADIATION ONCOLOGY | Facility: CLINIC | Age: 63
End: 2023-01-12
Payer: COMMERCIAL

## 2023-01-12 PROCEDURE — G6012 RADIATION TREATMENT DELIVERY: HCPCS | Performed by: SURGERY

## 2023-01-12 PROCEDURE — G6017 INTRAFRACTION TRACK MOTION: HCPCS | Performed by: SURGERY

## 2023-01-12 PROCEDURE — 77280 THER RAD SIMULAJ FIELD SMPL: CPT | Performed by: SURGERY

## 2023-01-13 ENCOUNTER — APPOINTMENT (OUTPATIENT)
Dept: RADIATION ONCOLOGY | Facility: CLINIC | Age: 63
End: 2023-01-13
Payer: COMMERCIAL

## 2023-01-13 PROCEDURE — G6012 RADIATION TREATMENT DELIVERY: HCPCS | Performed by: SURGERY

## 2023-01-13 PROCEDURE — G6017 INTRAFRACTION TRACK MOTION: HCPCS | Performed by: SURGERY

## 2023-01-16 ENCOUNTER — APPOINTMENT (OUTPATIENT)
Dept: RADIATION ONCOLOGY | Facility: CLINIC | Age: 63
End: 2023-01-16
Payer: COMMERCIAL

## 2023-01-16 PROCEDURE — 77427 RADIATION TX MANAGEMENT X5: CPT | Performed by: RADIOLOGY

## 2023-01-16 PROCEDURE — G6012 RADIATION TREATMENT DELIVERY: HCPCS | Performed by: RADIOLOGY

## 2023-01-16 PROCEDURE — G6017 INTRAFRACTION TRACK MOTION: HCPCS | Performed by: RADIOLOGY

## 2023-01-16 PROCEDURE — 77336 RADIATION PHYSICS CONSULT: CPT | Performed by: RADIOLOGY

## 2023-01-17 ENCOUNTER — APPOINTMENT (OUTPATIENT)
Dept: RADIATION ONCOLOGY | Facility: CLINIC | Age: 63
End: 2023-01-17
Payer: COMMERCIAL

## 2023-01-17 PROCEDURE — G6012 RADIATION TREATMENT DELIVERY: HCPCS | Performed by: SURGERY

## 2023-01-17 PROCEDURE — G6017 INTRAFRACTION TRACK MOTION: HCPCS | Performed by: SURGERY

## 2023-01-18 ENCOUNTER — OFFICE VISIT (OUTPATIENT)
Dept: RADIATION ONCOLOGY | Facility: CLINIC | Age: 63
End: 2023-01-18
Payer: COMMERCIAL

## 2023-01-18 ENCOUNTER — APPOINTMENT (OUTPATIENT)
Dept: RADIATION ONCOLOGY | Facility: CLINIC | Age: 63
End: 2023-01-18
Payer: COMMERCIAL

## 2023-01-18 VITALS
TEMPERATURE: 97.5 F | OXYGEN SATURATION: 95 % | HEART RATE: 79 BPM | DIASTOLIC BLOOD PRESSURE: 73 MMHG | WEIGHT: 185.6 LBS | SYSTOLIC BLOOD PRESSURE: 118 MMHG | RESPIRATION RATE: 16 BRPM

## 2023-01-18 DIAGNOSIS — Z17.0 MALIGNANT NEOPLASM OF LOWER-INNER QUADRANT OF LEFT BREAST IN FEMALE, ESTROGEN RECEPTOR POSITIVE (H): Primary | ICD-10-CM

## 2023-01-18 DIAGNOSIS — C50.312 MALIGNANT NEOPLASM OF LOWER-INNER QUADRANT OF LEFT BREAST IN FEMALE, ESTROGEN RECEPTOR POSITIVE (H): Primary | ICD-10-CM

## 2023-01-18 PROCEDURE — 99207 PR DROP WITH A PROCEDURE: CPT | Performed by: RADIOLOGY

## 2023-01-18 PROCEDURE — G6017 INTRAFRACTION TRACK MOTION: HCPCS | Performed by: RADIOLOGY

## 2023-01-18 PROCEDURE — G6012 RADIATION TREATMENT DELIVERY: HCPCS | Performed by: RADIOLOGY

## 2023-01-18 ASSESSMENT — PAIN SCALES - GENERAL: PAINLEVEL: MILD PAIN (3)

## 2023-01-18 NOTE — LETTER
2023         RE: Denise Tsang  [address] [facility] [address]        Dear Colleague,    Thank you for referring your patient, Denise Tsang, to the [facility] RADIATION [facility]COL[facility] [place]. Please see a copy of my visit note below.    [facility]YSICIANS  SPECIALIZING IN BREAKTHROUGHS  Radiation Oncology    On Treatment Visit Note      Denise Tsang      Date: 2023   MRN: [id number]   : 1960  Diagnosis: L breast IDC ER/[place]+ HEr2-      Reason for Visit:  On Radiation Treatment Visit     Treatment Summary to Date  Treatment Site: L breast + bst Current Dose: 4506/5256 cGy Fractions:       Chemotherapy  Chemo concurrent with radx?: No (Dr. Zurita)    Subjective:     Had more nipple discomfort this week.  But otherwise is doing well with expected side effects.    Nursing ROS:   Nutrition Alteration  Diet Type: Patient's Preference  Skin  Skin Reaction: 1 - Faint erythema or dry desquamation (no desquamation)  Skin Intervention: Aquaphor two times daily, Patient to use Neosporin + pain relief to nipple, Ibuprofen for swelling per Dr. Steele.        Cardiovascular  Respiratory effort: 1 - Normal - without distress        Psychosocial  Psychosocial Note: Patient reports occasional fatigue  Pain Assessment  0-10 Pain Scale: 3  Pain Note: Patient reports mild nipple pain, Neosporin + pain relief, gauze provided to cover nipple while wearing a bra, patient to take Ibuprofen as needed for swelling.      Objective:   /73   Pulse 79   Temp 97.5  F (36.4  C) (Oral)   Resp 16   Wt 84.2 kg (185 lb 9.6 oz)   SpO2 95%   Gen: Appears well, in no acute distress  Skin: Minimal diffuse erythema over treatment field    Labs:  CBC RESULTS: No results for input(s): WBC, RBC, HGB, HCT, MCV, MCH, MCHC, RDW, PLT in the last 32199 hours.  ELECTROLYTES:  No results for input(s): NA, POTASSIUM, CHLORIDE, DAVID, CO2, BUN, CR, GLC in the last 92709  hours.    Assessment:    Tolerating radiation therapy well.  All questions and concerns addressed.    Toxicities:  Pain: Grade 1: Mild pain  Dermatitis: Grade 1: Faint erythema or dry desquamation    Plan:   1. Continue current therapy.    2. Skin care per above (Neosporin + pain relief to nipple) with over-the-counter ibuprofen as prescribed on the bottle      Mosaiq chart and setup information reviewed  Ports checked    Medication Review  Med list reviewed with patient?: Yes  Med Note: Patient will call Dr. Zurita's office to get prescription for hormone therapy.    Educational Topic Discussed  Additional Instructions: Follow up with Dr. Zurita on 2/27/2023, Follow up with Shelley Rucker NP in 1 and 3.5 months.  Education Instructions: reviewed hydration and protein intake, fatigue, skin cares.        Fadi Steele MD    Please do not send letter to referring physician.          Again, thank you for allowing me to participate in the care of your patient.        Sincerely,        DEBBY Steele MD

## 2023-01-18 NOTE — PROGRESS NOTES
[facility] PHYSICIANS  SPECIALIZING IN BREAKTHROUGHS  Radiation Oncology    On Treatment Visit Note      Denise Tsang      Date: 2023   MRN: [id number]   : 1960  Diagnosis: L breast IDC ER/[place]+ HEr2-      Reason for Visit:  On Radiation Treatment Visit     Treatment Summary to Date  Treatment Site: L breast + bst Current Dose: 4506/5256 cGy Fractions:       Chemotherapy  Chemo concurrent with radx?: No (Dr. Zurita)    Subjective:     Had more nipple discomfort this week.  But otherwise is doing well with expected side effects.    Nursing ROS:   Nutrition Alteration  Diet Type: Patient's Preference  Skin  Skin Reaction: 1 - Faint erythema or dry desquamation (no desquamation)  Skin Intervention: Aquaphor two times daily, Patient to use Neosporin + pain relief to nipple, Ibuprofen for swelling per Dr. Steele.        Cardiovascular  Respiratory effort: 1 - Normal - without distress        Psychosocial  Psychosocial Note: Patient reports occasional fatigue  Pain Assessment  0-10 Pain Scale: 3  Pain Note: Patient reports mild nipple pain, Neosporin + pain relief, gauze provided to cover nipple while wearing a bra, patient to take Ibuprofen as needed for swelling.      Objective:   /73   Pulse 79   Temp 97.5  F (36.4  C) (Oral)   Resp 16   Wt 84.2 kg (185 lb 9.6 oz)   SpO2 95%   Gen: Appears well, in no acute distress  Skin: Minimal diffuse erythema over treatment field    Labs:  CBC RESULTS: No results for input(s): WBC, RBC, HGB, HCT, MCV, MCH, MCHC, RDW, PLT in the last 85646 hours.  ELECTROLYTES:  No results for input(s): NA, POTASSIUM, CHLORIDE, DAVID, CO2, BUN, CR, GLC in the last 88558 hours.    Assessment:    Tolerating radiation therapy well.  All questions and concerns addressed.    Toxicities:  Pain: Grade 1: Mild pain  Dermatitis: Grade 1: Faint erythema or dry desquamation    Plan:   1. Continue current therapy.    2. Skin care per above (Neosporin + pain relief  to nipple) with over-the-counter ibuprofen as prescribed on the bottle      Mosaiq chart and setup information reviewed  Ports checked    Medication Review  Med list reviewed with patient?: Yes  Med Note: Patient will call Dr. Zurita's office to get prescription for hormone therapy.    Educational Topic Discussed  Additional Instructions: Follow up with Dr. Zurita on 2/27/2023, Follow up with Shelley Rucker NP in 1 and 3.5 months.  Education Instructions: reviewed hydration and protein intake, fatigue, skin cares.        Fadi Steele MD    Please do not send letter to referring physician.

## 2023-01-19 ENCOUNTER — APPOINTMENT (OUTPATIENT)
Dept: RADIATION ONCOLOGY | Facility: CLINIC | Age: 63
End: 2023-01-19
Payer: COMMERCIAL

## 2023-01-19 PROCEDURE — G6012 RADIATION TREATMENT DELIVERY: HCPCS | Performed by: SURGERY

## 2023-01-19 PROCEDURE — G6017 INTRAFRACTION TRACK MOTION: HCPCS | Performed by: SURGERY

## 2023-01-20 ENCOUNTER — APPOINTMENT (OUTPATIENT)
Dept: RADIATION ONCOLOGY | Facility: CLINIC | Age: 63
End: 2023-01-20
Payer: COMMERCIAL

## 2023-01-20 PROCEDURE — G6012 RADIATION TREATMENT DELIVERY: HCPCS | Performed by: SURGERY

## 2023-01-20 PROCEDURE — G6017 INTRAFRACTION TRACK MOTION: HCPCS | Performed by: SURGERY

## 2023-01-23 ENCOUNTER — APPOINTMENT (OUTPATIENT)
Dept: RADIATION ONCOLOGY | Facility: CLINIC | Age: 63
End: 2023-01-23
Payer: COMMERCIAL

## 2023-01-23 PROCEDURE — G6012 RADIATION TREATMENT DELIVERY: HCPCS | Performed by: RADIOLOGY

## 2023-01-23 PROCEDURE — 77427 RADIATION TX MANAGEMENT X5: CPT | Performed by: RADIOLOGY

## 2023-01-23 PROCEDURE — 77336 RADIATION PHYSICS CONSULT: CPT | Performed by: RADIOLOGY

## 2023-01-23 PROCEDURE — G6017 INTRAFRACTION TRACK MOTION: HCPCS | Performed by: RADIOLOGY

## 2023-01-30 ENCOUNTER — DOCUMENTATION ONLY (OUTPATIENT)
Dept: RADIATION ONCOLOGY | Facility: CLINIC | Age: 63
End: 2023-01-30
Payer: COMMERCIAL

## 2023-01-30 DIAGNOSIS — C50.312 MALIGNANT NEOPLASM OF LOWER-INNER QUADRANT OF LEFT BREAST IN FEMALE, ESTROGEN RECEPTOR POSITIVE (H): Primary | ICD-10-CM

## 2023-01-30 DIAGNOSIS — Z17.0 MALIGNANT NEOPLASM OF LOWER-INNER QUADRANT OF LEFT BREAST IN FEMALE, ESTROGEN RECEPTOR POSITIVE (H): Primary | ICD-10-CM

## 2023-01-30 NOTE — PROGRESS NOTES
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       COMMENT/TOXICITY:  Grade 2 skin reaction managed with moisturizer and topical steroids. Patient did have mild fatigue relieved by rest.                PAIN MANAGEMENT:  Patient used ibuprofen and neosporin+pain relief as needed.                      FOLLOW UP PLAN:  Patient will follow up with Shelley Rucker NP in 1 month and in 3.5 months  Patient will continue close follow up with medical oncology.     Fadi Steele MD  Department of Radiation Oncology  [facility]     CC  Patient Care Team:  No Ref-Primary, Physician as PCP - General  Teresa Steele MD as MD (Radiation Oncology)  Teresa Steele MD as MD (Radiation Oncology)  Camille Danielle, ANDREW as Registered Nurse  Teresa Steele MD as Assigned Cancer Care Provider

## 2023-03-03 ENCOUNTER — VIRTUAL VISIT (OUTPATIENT)
Dept: RADIATION ONCOLOGY | Facility: CLINIC | Age: 63
End: 2023-03-03
Payer: COMMERCIAL

## 2023-03-03 DIAGNOSIS — C50.312 MALIGNANT NEOPLASM OF LOWER-INNER QUADRANT OF LEFT BREAST IN FEMALE, ESTROGEN RECEPTOR POSITIVE (H): Primary | ICD-10-CM

## 2023-03-03 DIAGNOSIS — Z17.0 MALIGNANT NEOPLASM OF LOWER-INNER QUADRANT OF LEFT BREAST IN FEMALE, ESTROGEN RECEPTOR POSITIVE (H): Primary | ICD-10-CM

## 2023-03-03 PROCEDURE — 99024 POSTOP FOLLOW-UP VISIT: CPT | Mod: VID | Performed by: NURSE PRACTITIONER

## 2023-03-03 NOTE — PROGRESS NOTES
"Radiation Oncology Follow-up Visit  March 3, 2023        Denise Tsang  MRN: [id number]   : 1960     DISEASE TREATED:    This is a 62 year old woman with left (LIQ) breast cancer, status post lumpectomy and SLNBx, revealing G2 IDCA with grade 3 DCIS, [id number] ER+/NM+/H2N- disease     RADIATION THERAPY DELIVERED:   4,256 cGy to left breast and 5,256 cGy to boost    INTERVAL SINCE COMPLETION OF RADIATION THERAPY:   1 month    SUBJECTIVE:   Denise Tsang is a 62 year old female who is here today for routine follow up after completing radiation therapy.  She has seen healing of her skin and continues to moisturize.  She does notice some slight decreased range of motion on the left. Denies any pain or swelling. Fatigue has improved.  She has seen medical oncology and is taking an AI without significant side effects.    ROS:  Complete review of systems is negative except for symptoms discussed in subjective above.    Current Outpatient Medications   Medication     albuterol (PROAIR HFA/PROVENTIL HFA/VENTOLIN HFA) 108 (90 Base) MCG/ACT inhaler     ALPRAZolam (XANAX) 0.5 MG tablet     atorvastatin (LIPITOR) 20 MG tablet     fluticasone (FLONASE) 50 MCG/ACT nasal spray     gabapentin (NEURONTIN) 100 MG capsule     minoxidil (LONITEN) 2.5 MG tablet     Multiple Vitamin (MULTIVITAMIN ADULT PO)     spironolactone (ALDACTONE) 50 MG tablet     No current facility-administered medications for this visit.          Allergies   Allergen Reactions     Azithromycin Nausea and Vomiting     Erythromycin Nausea and Vomiting       Past Medical History:   Diagnosis Date     Fear of flying 2016     Hyperlipidemia 2003    Formatting of this note might be different from the original. We used the 10 yr ASCVD risk  using her current risk factors and  her \"worst\" cholesterol numbers in 2006, and found that her ASCVD risk was  low, around 3%, and she may not be in a statin benefit group after all.   Denise " prefers not to use medication if not necessary. Will hold  atorvastatin for three months, recheck lipids      Malignant neoplasm of lower-inner quadrant of left breast in female, estrogen receptor positive (H) 10/11/2022     YIMI (obstructive sleep apnea) 7/5/2022    Last Assessment & Plan:  Formatting of this note might be different from the original. HST done 6/13/22 (wt 175 lbs) RDI 33 Lowest 02 80% Positional therapy with dental appliance     Sciatica 8/1/2013     Varicose veins of both lower extremities with complications 3/21/2019    Formatting of this note might be different from the original. Added automatically from request for surgery [id number]         PHYSICAL EXAM:  There were no vitals taken for this visit.  Gen: Alert, in NAD  Psychiatric: Appropriate mood and affect      LABS AND IMAGING:  Reviewed.    IMPRESSION:   Ms. Tsang is a 62 year old female with a IDCA of the left breast s/p 1 month out from radiation and doing well.    PLAN:   Patient has recovered nicely from acute side effects of radiation therapy.  Discussed long term care with continued moisturizing of the treated breast, stretching and range of motion exercises, and sun screen.  Patient will follow up with medical oncology for continued care and imaging.  She will follow up here in 2.5 months.  Discussed to come in or call with any concerns or questions that may develop.      Video-Visit Details    Type of service:  Video Visit    Joined the call at 3/3/2023, 9:59:58 am.  Left the call at 3/3/2023, 10:14:57 am.    Patient had difficulty and visit converted to phone visit.    Originating Location (pt. Location): Home    Distant Location (provider location):  Off-site    Mode of Communication:  Video Conference via Abilio Rucker NP  Radiation Oncology  [facility]

## 2023-05-12 ENCOUNTER — VIRTUAL VISIT (OUTPATIENT)
Dept: RADIATION ONCOLOGY | Facility: CLINIC | Age: 63
End: 2023-05-12
Payer: COMMERCIAL

## 2023-05-12 DIAGNOSIS — C50.312 MALIGNANT NEOPLASM OF LOWER-INNER QUADRANT OF LEFT BREAST IN FEMALE, ESTROGEN RECEPTOR POSITIVE (H): Primary | ICD-10-CM

## 2023-05-12 DIAGNOSIS — Z17.0 MALIGNANT NEOPLASM OF LOWER-INNER QUADRANT OF LEFT BREAST IN FEMALE, ESTROGEN RECEPTOR POSITIVE (H): Primary | ICD-10-CM

## 2023-05-12 PROCEDURE — 99441 PR PHYSICIAN TELEPHONE EVALUATION 5-10 MIN: CPT | Performed by: NURSE PRACTITIONER

## 2023-05-12 NOTE — PROGRESS NOTES
"Radiation Oncology Follow-up Visit  May 12, 2023      Denise Tsang  MRN: [id number]   : 1960     DISEASE TREATED:    This is a 62 year old woman with left (LIQ) breast cancer, status post lumpectomy and SLNBx, revealing G2 IDCA with grade 3 DCIS, [id number] ER+/IN+/H2N- disease     RADIATION THERAPY DELIVERED:   4,256 cGy to left breast and 5,256 cGy to boost    INTERVAL SINCE COMPLETION OF RADIATION THERAPY:   3.5 months    SUBJECTIVE:   Denise Tsang is a 62 year old female who is here today for routine follow up after completing radiation therapy.  She has seen healing of her skin and continues to moisturize.  She does notice some slight decreased range of motion on the left.  The morning on stretching.  She not currently in physical therapy at this point.  Denies any pain or swelling. Fatigue has improved.  She has seen medical oncology and is taking an AI without significant side effects.    ROS:  Complete review of systems is negative except for symptoms discussed in subjective above.    Current Outpatient Medications   Medication     albuterol (PROAIR HFA/PROVENTIL HFA/VENTOLIN HFA) 108 (90 Base) MCG/ACT inhaler     ALPRAZolam (XANAX) 0.5 MG tablet     atorvastatin (LIPITOR) 20 MG tablet     fluticasone (FLONASE) 50 MCG/ACT nasal spray     gabapentin (NEURONTIN) 100 MG capsule     minoxidil (LONITEN) 2.5 MG tablet     Multiple Vitamin (MULTIVITAMIN ADULT PO)     spironolactone (ALDACTONE) 50 MG tablet     No current facility-administered medications for this visit.          Allergies   Allergen Reactions     Azithromycin Nausea and Vomiting     Erythromycin Nausea and Vomiting       Past Medical History:   Diagnosis Date     Fear of flying 2016     Hyperlipidemia 2003    Formatting of this note might be different from the original. We used the 10 yr ASCVD risk  using her current risk factors and  her \"worst\" cholesterol numbers in , and found that her ASCVD risk was  " low, around 3%, and she may not be in a statin benefit group after all.   Denise prefers not to use medication if not necessary. Will hold  atorvastatin for three months, recheck lipids      Malignant neoplasm of lower-inner quadrant of left breast in female, estrogen receptor positive (H) 10/11/2022     YIMI (obstructive sleep apnea) 7/5/2022    Last Assessment & Plan:  Formatting of this note might be different from the original. HST done 6/13/22 (wt 175 lbs) RDI 33 Lowest 02 80% Positional therapy with dental appliance     Sciatica 8/1/2013     Varicose veins of both lower extremities with complications 3/21/2019    Formatting of this note might be different from the original. Added automatically from request for surgery [id number]         PHYSICAL EXAM:  There were no vitals taken for this visit.  Gen: Alert, in NAD  Psychiatric: Appropriate mood and affect      LABS AND IMAGING:  Reviewed.    IMPRESSION:   Ms. Tsang is a 62 year old female with a IDCA of the left breast s/p 3+ months out from radiation and doing well.    PLAN:   Patient has recovered nicely from acute side effects of radiation therapy.  Discussed long term care with continued moisturizing of the treated breast, stretching and range of motion exercises, and sun screen.  We decided physical therapy.  She not interested at this time and contact later if needed.  Patient will follow up with medical oncology. if she notices complexes are tightening on the left eye discussed to come in or call with any concerns or questions that may develop.      Virtual Visit Details    Type of service:  Phone visit     Patient had trouble video so converted to phone visit.    Originating Location (pt. Location): Home  Distant Location (provider location):  Off-site  Platform used for Video Visit: Telephone     Phone visit 7 minutes  Total time 15 minutes          Shelley Rucker NP  Radiation Oncology  [facility]

## 2023-12-31 ENCOUNTER — HEALTH MAINTENANCE LETTER (OUTPATIENT)
Age: 63
End: 2023-12-31

## 2024-12-15 ENCOUNTER — HEALTH MAINTENANCE LETTER (OUTPATIENT)
Age: 64
End: 2024-12-15